# Patient Record
Sex: MALE | Race: AMERICAN INDIAN OR ALASKA NATIVE
[De-identification: names, ages, dates, MRNs, and addresses within clinical notes are randomized per-mention and may not be internally consistent; named-entity substitution may affect disease eponyms.]

---

## 2017-09-16 NOTE — EMERGENCY DEPARTMENT REPORT
ED Male  HPI





- General


Chief complaint: Urogenital-Male


Stated complaint: STD SYMPTOMS


Time Seen by Provider: 09/16/17 23:24


Source: patient, RN notes reviewed


Mode of arrival: Ambulatory


Limitations: No Limitations





- History of Present Illness


Initial comments: 





This is a 32-year-old male, the patient is previously known to this provider, 

he recently moved here from [place], he reports a past medical history of HIV, 

he does not know his CD4 count, he reports that his viral load is undetectable, 

and he reports that he is on highly active antiretroviral therapy.  Patient 

reports new intimate partner within the past few weeks, patient admits to 

active and passive intercourse with an denies barrier protection.  He presents 

with








A complaint of penile discharge, and dysuria.  There is no testicular pain.  No 

fevers or chills, no abdominal pain, no sore throat, no rash, no lethargy or 

irritability, no other complaints, patient's symptoms do not have exacerbating 

or relieving factors.


MD Complaint: dysuria


-: Gradual


Location: penis


Quality: burning


Consistency: intermittent


Improves with: none


Worsens with: none


denies other symptoms





- Related Data


Sexually active: Yes


 Home Medications











 Medication  Instructions  Recorded  Confirmed  Last Taken


 


Triumeq Tablet  09/16/17 09/16/17 08:30











 Allergies











Allergy/AdvReac Type Severity Reaction Status Date / Time


 


No Known Allergies Allergy   Unverified 09/16/17 18:09














ED Review of Systems


ROS: 


Stated complaint: STD SYMPTOMS


Other details as noted in HPI





Constitutional: denies: fever


Eyes: denies: eye discharge


ENT: denies: throat pain, epistaxis, congestion


Respiratory: denies: cough


Cardiovascular: denies: chest pain


Gastrointestinal: denies: abdominal pain


Genitourinary: dysuria, frequency.  denies: testicular pain


Musculoskeletal: denies: back pain


Skin: denies: lesions


Neurological: denies: weakness





ED Past Medical Hx





- Past Medical History


Hx HIV: Yes





- Surgical History


Additional Surgical History: Hernia Repair 2012





- Medications


Home Medications: 


 Home Medications











 Medication  Instructions  Recorded  Confirmed  Last Taken  Type


 


Triumeq Tablet  09/16/17 09/16/17 08:30 History














ED Physical Exam





- General


Limitations: No Limitations


General appearance: alert, in no apparent distress





- Head


Head exam: Present: atraumatic, normocephalic





- Eye


Eye exam: Present: normal appearance, EOMI.  Absent: nystagmus





- ENT


ENT exam: Present: normal exam, normal orophraynx, mucous membranes moist, TM's 

normal bilaterally, normal external ear exam





- Neck


Neck exam: Present: normal inspection, full ROM.  Absent: tenderness, 

meningismus





- Respiratory


Respiratory exam: Present: normal lung sounds bilaterally.  Absent: respiratory 

distress, wheezes, rales, rhonchi, stridor, chest wall tenderness





- Cardiovascular


Cardiovascular Exam: Present: regular rate, normal rhythm, normal heart sounds.

  Absent: bradycardia, tachycardia, irregular rhythm, systolic murmur, 

diastolic murmur, rubs, gallop





- GI/Abdominal


GI/Abdominal exam: Present: soft, normal bowel sounds.  Absent: distended, 

tenderness, guarding, rebound, rigid, pulsatile mass





- Rectal


Rectal exam: Present: deferred





- 


 exam: Present: normal inspection.  Absent: testicular tenderness


External exam: Present: normal external exam, other (there is no testicular 

tenderness.  There is normal testicular lie bilaterally.  There is normal 

cremasteric reflex bilaterally.)





- Extremities Exam


Extremities exam: Present: normal inspection, full ROM, normal capillary 

refill.  Absent: pedal edema, joint swelling, calf tenderness





- Back Exam


Back exam: Present: normal inspection, full ROM.  Absent: tenderness, CVA 

tenderness (R), CVA tenderness (L), muscle spasm, paraspinal tenderness, 

vertebral tenderness





- Neurological Exam


Neurological exam: Present: alert, oriented X3, normal gait, other (Extraocular 

movements intact.  Tongue midline.  No facial droop.  Facial sensation intact 

to light touch in the V1, V2, V3 distribution bilaterally.  5 and 5 strength in 

4 extremities..  Sensation is intact to light touch in 4 extremities.).  Absent

: motor sensory deficit





- Psychiatric


Psychiatric exam: Present: normal affect, normal mood





- Skin


Skin exam: Present: warm, dry, intact, normal color.  Absent: rash





ED Course


 Vital Signs











  09/16/17





  18:05


 


Temperature 99.1 F


 


Pulse Rate 84


 


Respiratory 18





Rate 


 


Blood Pressure 144/99


 


O2 Sat by Pulse 99





Oximetry 














ED Medical Decision Making





- Lab Data








 Vital Signs











  09/16/17





  18:05


 


Temperature 99.1 F


 


Pulse Rate 84


 


Respiratory 18





Rate 


 


Blood Pressure 144/99


 


O2 Sat by Pulse 99





Oximetry 














 Lab Results











  09/16/17 Range/Units





  18:30 


 


Urine Color  Yellow  (Yellow)  


 


Urine Turbidity  Slightly-cloudy  (Clear)  


 


Urine pH  6.0  (5.0-7.0)  


 


Ur Specific Gravity  1.018  (1.003-1.030)  


 


Urine Protein  <15 mg/dl  (Negative)  mg/dL


 


Urine Glucose (UA)  Neg  (Negative)  mg/dL


 


Urine Ketones  Neg  (Negative)  mg/dL


 


Urine Blood  Neg  (Negative)  


 


Urine Nitrite  Neg  (Negative)  


 


Urine Bilirubin  Neg  (Negative)  


 


Urine Urobilinogen  < 2.0  (<2.0)  mg/dL


 


Ur Leukocyte Esterase  Lg  (Negative)  


 


Urine WBC (Auto)  0.0  (0.0-6.0)  /HPF


 


Urine RBC (Auto)  7.0  (0.0-6.0)  /HPF


 


Urine Mucus  Few  /HPF

















- Medical Decision Making





Differential diagnosis: Urethritis, nonspecific





Assessment and plan: 32-year-old male with complaint of urethritis, urinalysis 

demonstrates large leukocyte esterase, patient will be treated empirically with 

ceftriaxone and azithromycin, he is counseled to engage in safe sex practices, 

and he is instructed to follow up with local health department or HIV 

specialist or primary care doctor.  Physical exam otherwise unremarkable, he is 

suitable for discharge at this point in time, return precautions are reviewed.


Critical care attestation.: 


If time is entered above; I have spent that time in minutes in the direct care 

of this critically ill patient, excluding procedure time.








ED Disposition


Clinical Impression: 


 Urethritis





Disposition: DC-01 TO HOME OR SELFCARE


Is pt being admited?: No


Does the pt Need Aspirin: No


Condition: Stable


Instructions:  Nonspecific Urethritis in Men (ED)


Additional Instructions: 


 Cultures were sent today, and results will be available next 3-5 days.  Please 

have your primary care doctor call the medical records department to obtain 

your culture results.   I recommend outpatient testing for sexually transmitted 

diseases, including hepatitis, syphilis  I also recommend that you abstain from 

sexual activity until you have completed her antibiotic therapy, a physician 

states that it is safe for you to resume sexual activity, and any partners that 

you have been sexually active with have been tested/treated/evaluated for 

sexual transmitted diseases.




















Follow-up with the primary care doctor or infectious disease specialist within 

the next 2 weeks.


Make certain to wear barrier protection when engaging in sexual intimacy.














I recommend that you return to the ER right away with worsening pain, migration 

of pain, intractable nausea/vomiting, inability tolerate liquid feeds.


Referrals: 


PRIMARY CARE,MD [Primary Care Provider] - 3-5 Days


DONALDO FLEMING MD [Staff Physician] - 3-5 Days


ELIS RUBY MD [Staff Physician] - 3-5 Days


[facility] [Outside] - 3-5 Days


Forms:  STI Treatment and Prevention

## 2019-04-17 ENCOUNTER — HOSPITAL ENCOUNTER (EMERGENCY)
Dept: HOSPITAL 5 - ED | Age: 34
End: 2019-04-17
Payer: SELF-PAY

## 2019-04-17 VITALS — SYSTOLIC BLOOD PRESSURE: 131 MMHG | DIASTOLIC BLOOD PRESSURE: 93 MMHG

## 2019-04-17 DIAGNOSIS — Z53.21: ICD-10-CM

## 2019-04-17 DIAGNOSIS — R51: Primary | ICD-10-CM

## 2019-08-31 ENCOUNTER — HOSPITAL ENCOUNTER (EMERGENCY)
Dept: HOSPITAL 5 - ED | Age: 34
Discharge: HOME | End: 2019-08-31
Payer: COMMERCIAL

## 2019-08-31 VITALS — SYSTOLIC BLOOD PRESSURE: 124 MMHG | DIASTOLIC BLOOD PRESSURE: 85 MMHG

## 2019-08-31 DIAGNOSIS — F17.200: ICD-10-CM

## 2019-08-31 DIAGNOSIS — Z98.890: ICD-10-CM

## 2019-08-31 DIAGNOSIS — Z79.1: ICD-10-CM

## 2019-08-31 DIAGNOSIS — F12.10: ICD-10-CM

## 2019-08-31 DIAGNOSIS — G43.909: Primary | ICD-10-CM

## 2019-08-31 DIAGNOSIS — Z21: ICD-10-CM

## 2019-08-31 DIAGNOSIS — R42: ICD-10-CM

## 2019-08-31 PROCEDURE — 82962 GLUCOSE BLOOD TEST: CPT

## 2019-08-31 PROCEDURE — 96372 THER/PROPH/DIAG INJ SC/IM: CPT

## 2019-08-31 PROCEDURE — 70450 CT HEAD/BRAIN W/O DYE: CPT

## 2019-08-31 PROCEDURE — 99284 EMERGENCY DEPT VISIT MOD MDM: CPT

## 2019-08-31 NOTE — CAT SCAN REPORT
CT head/brain wo con 



INDICATION / CLINICAL INFORMATION:

headache and dizziness.



TECHNIQUE:

All CT scans at this location are performed using CT dose reduction for ALARA by means of automated e
xposure control. 



COMPARISON:

None available.



FINDINGS:

Ventricle size is normal. No mass or mass effect is seen. There is no evidence of intracranial hemorr
juan. No obvious area of infarction is identified. Mild mucosal thickening is seen in the right maxil
florence sinus.



IMPRESSION:

No acute findings.



Signer Name: Gregor Muse MD FACJOSTIN 

Signed: 8/31/2019 9:09 AM

 Workstation Name: [id number]

## 2019-08-31 NOTE — EMERGENCY DEPARTMENT REPORT
ED General Adult HPI





- General


Chief complaint: Neck Pain/Injury


Stated complaint: DIZZY/PRESSURE/RT SIDE NECK PAIN


Time Seen by Provider: 19 07:59


Source: patient


Mode of arrival: Ambulatory


Limitations: No Limitations





- History of Present Illness


Initial comments: 





This is a 34-year-old -American male who presents to the emergency room 

with dizziness and a headache radiating to right neck for 2 days.  He reports a 

past medical history of HIV and migraines.  Patient reports symptoms worsened 

this morning when he got out of the shower he felt hot and easy.  He reports a 

throbbing pain to right side of head radiating to right side of neck.  He 

reports having headaches in the past but nothing like this one.  Patient states 

he is a daily marijuana smoker.  He last smoked marijuana 3 hours prior to 

arrival.  He denies nausea, vomiting, chest pain, palpitations, shortness of 

breath, cough, fever, chills.


Onset/Timin


-: days(s)


Location: head


Radiation: neck


Severity scale (0 -10): 8


Quality: stabbing


Consistency: constant


Improves with: none


Worsens with: movement


Associated Symptoms: denies other symptoms


Treatments Prior to Arrival: none





- Related Data


                                Home Medications











 Medication  Instructions  Recorded  Confirmed  Last Taken


 


Triumeq Tablet  17 08:30








                                  Previous Rx's











 Medication  Instructions  Recorded  Last Taken  Type


 


Ibuprofen [Motrin 600 MG tab] 600 mg PO Q8H PRN #20 tablet 19 Unknown Rx


 


Meclizine [Antivert] 25 mg PO BID PRN #20 tablet 19 Unknown Rx











                                    Allergies











Allergy/AdvReac Type Severity Reaction Status Date / Time


 


No Known Allergies Allergy   Unverified 17 18:09














ED Review of Systems


ROS: 


Stated complaint: DIZZY/PRESSURE/RT SIDE NECK PAIN


Other details as noted in HPI





Constitutional: denies: chills, fever


Respiratory: denies: cough, shortness of breath, wheezing


Cardiovascular: denies: chest pain, palpitations


Gastrointestinal: denies: abdominal pain, nausea, diarrhea


Musculoskeletal: arthralgia (right sided neck pain).  denies: back pain, joint 

swelling


Skin: denies: rash, lesions


Neurological: headache.  denies: weakness, paresthesias


Psychiatric: denies: anxiety, depression





ED Past Medical Hx





- Past Medical History


Previous Medical History?: Yes


Hx HIV: Yes





- Surgical History


Past Surgical History?: Yes


Additional Surgical History: Hernia Repair 





- Social History


Smoking Status: Current Every Day Smoker


Substance Use Type: Alcohol, Marijuana





- Medications


Home Medications: 


                                Home Medications











 Medication  Instructions  Recorded  Confirmed  Last Taken  Type


 


Triumeq Tablet  17 08:30 History


 


Ibuprofen [Motrin 600 MG tab] 600 mg PO Q8H PRN #20 tablet 19  Unknown Rx


 


Meclizine [Antivert] 25 mg PO BID PRN #20 tablet 19  Unknown Rx














ED Physical Exam





- General


Limitations: No Limitations


General appearance: alert, in no apparent distress





- Head


Head exam: Present: atraumatic, normocephalic





- Neck


Neck exam: Present: normal inspection





- Respiratory


Respiratory exam: Present: normal lung sounds bilaterally.  Absent: respiratory 

distress





- Cardiovascular


Cardiovascular Exam: Present: regular rate, normal rhythm.  Absent: systolic 

murmur, diastolic murmur, rubs, gallop





- GI/Abdominal


GI/Abdominal exam: Present: soft, normal bowel sounds.  Absent: distended, 

tenderness, guarding, rebound, rigid





- Neurological Exam


Neurological exam: Present: alert, oriented X3, normal gait





- Psychiatric


Psychiatric exam: Present: normal affect, normal mood





- Skin


Skin exam: Present: warm, dry, intact, normal color.  Absent: rash





ED Course


                                   Vital Signs











  19





  07:42


 


Temperature 98.4 F


 


Pulse Rate 91 H


 


Respiratory 18





Rate 


 


Blood Pressure 145/96


 


O2 Sat by Pulse 99





Oximetry 














ED Medical Decision Making





- Lab Data








                                   Lab Results











  19 Range/Units





  08:34 


 


POC Glucose  85  ()  














- Radiology Data


Radiology results: report reviewed





CT head/brain wo con 





INDICATION / CLINICAL INFORMATION: 


headache and dizziness. 





TECHNIQUE: 


All CT scans at this location are performed using CT dose reduction for ALARA by

 means of automated


exposure control. 





COMPARISON: 


None available. 





FINDINGS: 


Ventricle size is normal. No mass or mass effect is seen. There is no evidence 

of intracranial 


hemorrhage. No obvious area of infarction is identified. Mild mucosal thickening

 is seen in the 


right maxillary sinus. 





IMPRESSION: 


No acute findings. 





- Medical Decision Making





Patient is stable and was examined by me.  History of HIV and migraines.  No 

signs of distress.  Patient currently taking Triumeq for HIV daily.  Accucheck 

obtained and normal.  CT of head obtained and dictated by radiologist with no 

acute findings.  Given toradol and meclizine once in ER.  Patient reports 

feeling better.  Normal head impulse test. Start meclizine and ibuprofen for 

headache and vertigo.  Instructed to follow up with PCP at [place].  No further 

questions noted by the patient. Discharged home in stable condition. 


Critical care attestation.: 


If time is entered above; I have spent that time in minutes in the direct care 

of this critically ill patient, excluding procedure time.








ED Disposition


Clinical Impression: 


 Vertigo, Dizziness





Migraine


Qualifiers:


 Migraine type: without aura Status migrainosus presence: with status 

migrainosus Intractability: not intractable Qualified Code(s): G43.001 - 

Migraine without aura, not intractable, with status migrainosus





Disposition:  TO HOME OR SELFCARE


Is pt being admited?: No


Does the pt Need Aspirin: No


Condition: Stable


Instructions:  Acute Headache (ED), Migraine Headache (ED), Vertigo (ED)


Additional Instructions: 


Take medication at start of headache.


Moderate caffeine intake.


Eat at scheduled times or 3 meals a day with snacks.


Follow up with primary care provider in 24-72 hours.


Prescriptions: 


Meclizine [Antivert] 25 mg PO BID PRN #20 tablet


 PRN Reason: Vertigo


Ibuprofen [Motrin 600 MG tab] 600 mg PO Q8H PRN #20 tablet


 PRN Reason: Pain


Referrals: 


[facility] [Provider Group] - 3-5 Days


Forms:  Work/School Release Form(ED), Accompanied Note


Time of Disposition: 10:01

## 2021-07-02 ENCOUNTER — HOSPITAL ENCOUNTER (EMERGENCY)
Dept: HOSPITAL 5 - ED | Age: 36
Discharge: HOME | End: 2021-07-02
Payer: SELF-PAY

## 2021-07-02 VITALS — SYSTOLIC BLOOD PRESSURE: 138 MMHG | DIASTOLIC BLOOD PRESSURE: 89 MMHG

## 2021-07-02 DIAGNOSIS — R19.7: ICD-10-CM

## 2021-07-02 DIAGNOSIS — Z79.899: ICD-10-CM

## 2021-07-02 DIAGNOSIS — F17.200: ICD-10-CM

## 2021-07-02 DIAGNOSIS — F12.90: ICD-10-CM

## 2021-07-02 DIAGNOSIS — Z98.890: ICD-10-CM

## 2021-07-02 DIAGNOSIS — R11.2: Primary | ICD-10-CM

## 2021-07-02 DIAGNOSIS — Z21: ICD-10-CM

## 2021-07-02 LAB
ALBUMIN SERPL-MCNC: 4.1 G/DL (ref 3.9–5)
ALT SERPL-CCNC: 14 UNITS/L (ref 7–56)
BAND NEUTROPHILS # (MANUAL): 0 K/MM3
BILIRUB UR QL STRIP: (no result)
BLOOD UR QL VISUAL: (no result)
BUN SERPL-MCNC: 13 MG/DL (ref 9–20)
BUN/CREAT SERPL: 19 %
CALCIUM SERPL-MCNC: 9.3 MG/DL (ref 8.4–10.2)
HCT VFR BLD CALC: 42 % (ref 35.5–45.6)
HEMOLYSIS INDEX: 11
HGB BLD-MCNC: 14.6 GM/DL (ref 11.8–15.2)
MCHC RBC AUTO-ENTMCNC: 35 % (ref 32–34)
MCV RBC AUTO: 96 FL (ref 84–94)
MUCOUS THREADS #/AREA URNS HPF: (no result) /HPF
MYELOCYTES # (MANUAL): 0 K/MM3
PH UR STRIP: 6 [PH] (ref 5–7)
PLATELET # BLD: 208 K/MM3 (ref 140–440)
PROMYELOCYTES # (MANUAL): 0 K/MM3
RBC # BLD AUTO: 4.36 M/MM3 (ref 3.65–5.03)
RBC #/AREA URNS HPF: 2 /HPF (ref 0–6)
TOTAL CELLS COUNTED BLD: 100
UROBILINOGEN UR-MCNC: 2 MG/DL (ref ?–2)
WBC #/AREA URNS HPF: 1 /HPF (ref 0–6)

## 2021-07-02 PROCEDURE — 83690 ASSAY OF LIPASE: CPT

## 2021-07-02 PROCEDURE — 81001 URINALYSIS AUTO W/SCOPE: CPT

## 2021-07-02 PROCEDURE — 85025 COMPLETE CBC W/AUTO DIFF WBC: CPT

## 2021-07-02 PROCEDURE — 80053 COMPREHEN METABOLIC PANEL: CPT

## 2021-07-02 PROCEDURE — 85007 BL SMEAR W/DIFF WBC COUNT: CPT

## 2021-07-02 PROCEDURE — 36415 COLL VENOUS BLD VENIPUNCTURE: CPT

## 2021-07-02 NOTE — EMERGENCY DEPARTMENT REPORT
ED N/V/D HPI





- General


Chief complaint: Nausea/Vomiting/Diarrhea


Stated complaint: VOMITING DIARRHEA CRAMPS


Time Seen by Provider: 21 13:23


Source: patient


Mode of arrival: Ambulatory


Limitations: No Limitations





- History of Present Illness


Initial comments: 





35-year-old -American male with a history of HIV and is on Biktarvy 

presents to the emergency room for 4-day history of nausea vomiting and 

diarrhea.  Patient states that the cramping has improved.  Patient reports he 

has diarrhea when he does not eat and when he eats he has to vomit.  Patient 

denies any fever no chills.  Patient denies any cough no chest pain no shortness

of breath no dysuria no penile discharge or hematuria.  Patient states he is 

followed by [facility] next appointment is 2021.  He is detectable.


MD complaint: nausea, vomiting, diarrhea


Onset/Timin


-: days(s)


Description of Vomiting: food contents


Description of Diarrhea: water


Associated Abdominal Pain: Yes (Cramping but has improved)


Radiation: none


Severity: mild


Quality: cramping


Consistency: now resolved


Improves with: none


Worsens with: eating


Context: possible food poisoning


Associated Symptoms: nausea/vomiting.  denies: fever/chills





- Related Data


                                Home Medications











 Medication  Instructions  Recorded  Confirmed  Last Taken


 


Triumeq Tablet  17 08:30








                                  Previous Rx's











 Medication  Instructions  Recorded  Last Taken  Type


 


Ibuprofen [Motrin 600 MG tab] 600 mg PO Q8H PRN #20 tablet 19 Unknown Rx


 


Meclizine [Antivert] 25 mg PO BID PRN #20 tablet 19 Unknown Rx


 


Ondansetron [Zofran Odt] 4 mg PO Q8HR #12 tab.rapdis 21 Unknown Rx











                                    Allergies











Allergy/AdvReac Type Severity Reaction Status Date / Time


 


No Known Allergies Allergy   Verified 21 12:32














ED Review of Systems


ROS: 


Stated complaint: VOMITING DIARRHEA CRAMPS


Other details as noted in HPI





Comment: All other systems reviewed and negative





ED Past Medical Hx





- Past Medical History


Hx HIV: Yes





- Surgical History


Additional Surgical History: Hernia Repair 





- Social History


Smoking Status: Current Every Day Smoker


Substance Use Type: Alcohol, Marijuana





- Medications


Home Medications: 


                                Home Medications











 Medication  Instructions  Recorded  Confirmed  Last Taken  Type


 


Triumeq Tablet  17 08:30 History


 


Ibuprofen [Motrin 600 MG tab] 600 mg PO Q8H PRN #20 tablet 19  Unknown Rx


 


Meclizine [Antivert] 25 mg PO BID PRN #20 tablet 19  Unknown Rx


 


Ondansetron [Zofran Odt] 4 mg PO Q8HR #12 tab.rapdis 21  Unknown Rx














ED Physical Exam





- General


Limitations: No Limitations


General appearance: alert





- Head


Head exam: Present: atraumatic, normocephalic





- ENT


ENT exam: Present: normal exam, normal external ear exam





- Neck


Neck exam: Present: normal inspection, full ROM





- Respiratory


Respiratory exam: Present: normal lung sounds bilaterally.  Absent: accessory 

muscle use





- Cardiovascular


Cardiovascular Exam: Present: regular rate





- GI/Abdominal


GI/Abdominal exam: Present: soft, hyperactive bowel sounds.  Absent: distended, 

tenderness





ED Course


                                   Vital Signs











  21





  12:33


 


Temperature 98.4 F


 


Pulse Rate 96 H


 


Respiratory 18





Rate 


 


Blood Pressure 138/89





[Right] 


 


O2 Sat by Pulse 100





Oximetry 














ED Medical Decision Making





- Lab Data


Result diagrams: 


                                 21 13:06





                                 21 13:06








                                Laboratory Tests











  21





  13:06 13:06


 


WBC  5.7 


 


RBC  4.36 


 


Hgb  14.6 


 


Hct  42.0 


 


MCV  96 H 


 


MCH  34 H 


 


MCHC  35 H 


 


RDW  12.9 L 


 


Plt Count  208 


 


Lymph % (Auto)  Np 


 


Seg Neutrophils %  Np 


 


Sodium   139


 


Potassium   3.9


 


Chloride   103.3


 


Carbon Dioxide   29


 


Anion Gap   11


 


BUN   13


 


Creatinine   0.7 L


 


Estimated GFR   > 60


 


BUN/Creatinine Ratio   19


 


Glucose   98


 


Calcium   9.3


 


Total Bilirubin   0.30


 


AST   23


 


ALT   14


 


Alkaline Phosphatase   193 H


 


Total Protein   7.5


 


Albumin   4.1


 


Albumin/Globulin Ratio   1.2


 


Lipase   54














- Medical Decision Making





35-year-old -American male with a history of HIV and is on Biktarvy 

presents to the emergency room for 4-day history of nausea vomiting and 

diarrhea.  Patient states that the cramping has improved.  Patient reports he 

has diarrhea when he does not eat and when he eats he has to vomit.  Patient 

denies any fever no chills.  Patient denies any cough no chest pain no shortness

 of breath no dysuria no penile discharge or hematuria.  Patient states he is 

followed by [facility] next appointment is 2021.  He is detectable.








Zofran 4 mg p.o. and p.o. challenge initiated.


Critical care attestation.: 


If time is entered above; I have spent that time in minutes in the direct care 

of this critically ill patient, excluding procedure time.








ED Disposition


Clinical Impression: 


 Nausea vomiting and diarrhea





Disposition: DC-01 TO HOME OR SELFCARE


Is pt being admited?: No


Does the pt Need Aspirin: No


Condition: Stable


Instructions:  Diarrhea, Adult, Easy-to-Read, Nausea and Vomiting, Adult, 

Easy-to-Read


Additional Instructions: 


You can take the Zofran for the nausea.  Increase your fluid intake advance your

 diet as tolerated.  Be sure to take all your chronic medications as prescribed 

by your doctors.  Recommend over-the-counter Imodium A-D for diarrhea follow 

instructions on the box.  Keep your follow-up appointment with your ID doctor.


Prescriptions: 


Ondansetron [Zofran Odt] 4 mg PO Q8HR #12 tab.rapdis


Referrals: 


ALEBRT RODRIGUEZ MD [Staff Physician] - 3-5 Days


Forms:  Work/School Release Form(ED)